# Patient Record
Sex: MALE | Race: WHITE
[De-identification: names, ages, dates, MRNs, and addresses within clinical notes are randomized per-mention and may not be internally consistent; named-entity substitution may affect disease eponyms.]

---

## 2017-11-24 ENCOUNTER — HOSPITAL ENCOUNTER (EMERGENCY)
Dept: HOSPITAL 12 - ER | Age: 72
Discharge: HOME | End: 2017-11-24
Payer: MEDICARE

## 2017-11-24 VITALS — WEIGHT: 167 LBS | HEIGHT: 66 IN | BODY MASS INDEX: 26.84 KG/M2

## 2017-11-24 DIAGNOSIS — M19.90: ICD-10-CM
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
## 2017-11-24 DIAGNOSIS — E78.5: ICD-10-CM

## 2017-11-24 DIAGNOSIS — E03.9: ICD-10-CM
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
## 2017-11-24 DIAGNOSIS — I10: ICD-10-CM

## 2017-11-24 DIAGNOSIS — S62.306A: Primary | ICD-10-CM

## 2017-11-24 PROCEDURE — 29125 APPL SHORT ARM SPLINT STATIC: CPT

## 2017-11-24 PROCEDURE — A4663 DIALYSIS BLOOD PRESSURE CUFF: HCPCS

## 2017-11-24 PROCEDURE — 99284 EMERGENCY DEPT VISIT MOD MDM: CPT

## 2017-11-24 PROCEDURE — 73130 X-RAY EXAM OF HAND: CPT

## 2017-11-24 NOTE — NUR
Patient discharged to home in stable conditon.  Written and verbal after care 
instructions given. 

Patient verbalizes understanding of instructions. Stressed follow up with 
pmd/ortho.

## 2018-08-09 ENCOUNTER — HOSPITAL ENCOUNTER (EMERGENCY)
Dept: HOSPITAL 12 - ER | Age: 73
Discharge: HOME | End: 2018-08-09
Payer: MEDICARE

## 2018-08-09 VITALS — DIASTOLIC BLOOD PRESSURE: 72 MMHG | SYSTOLIC BLOOD PRESSURE: 141 MMHG

## 2018-08-09 VITALS — BODY MASS INDEX: 26.52 KG/M2 | HEIGHT: 66 IN | WEIGHT: 165 LBS

## 2018-08-09 DIAGNOSIS — E03.9: ICD-10-CM

## 2018-08-09 DIAGNOSIS — I10: ICD-10-CM

## 2018-08-09 DIAGNOSIS — K11.5: Primary | ICD-10-CM

## 2018-08-09 DIAGNOSIS — E78.5: ICD-10-CM

## 2018-08-09 PROCEDURE — 99283 EMERGENCY DEPT VISIT LOW MDM: CPT

## 2018-08-09 PROCEDURE — A4663 DIALYSIS BLOOD PRESSURE CUFF: HCPCS
